# Patient Record
Sex: FEMALE | Race: WHITE | NOT HISPANIC OR LATINO | Employment: UNEMPLOYED | URBAN - METROPOLITAN AREA
[De-identification: names, ages, dates, MRNs, and addresses within clinical notes are randomized per-mention and may not be internally consistent; named-entity substitution may affect disease eponyms.]

---

## 2018-10-26 ENCOUNTER — TELEPHONE (OUTPATIENT)
Dept: SURGERY | Facility: CLINIC | Age: 53
End: 2018-10-26

## 2018-10-26 NOTE — TELEPHONE ENCOUNTER
----- Message from Elke Watts sent at 10/26/2018  2:35 PM CDT -----  Contact: Self [phone]  Patient is requesting a call back from the nurse to schedule as a new patient for a possible multiple cysts in the left breast.  Patient has had a mammogram, spot compression and ultrasound and her referral and records were to have been sent over from [facility].    Patient may be reached at [phone].    Thank you.  LC

## 2018-10-26 NOTE — TELEPHONE ENCOUNTER
Left VM with my direct contact information, patient advised to give a return call with any questions or concerns regarding request for appointment, pt falls into abnormal mammogram protocol, patient will need to have all breast images on a disc with reports sent to [facility] prior to appointment

## 2018-10-29 ENCOUNTER — TELEPHONE (OUTPATIENT)
Dept: SURGERY | Facility: CLINIC | Age: 53
End: 2018-10-29

## 2018-10-29 NOTE — TELEPHONE ENCOUNTER
----- Message from Linda Cruz MA sent at 10/29/2018  9:23 AM CDT -----  Contact: Self [phone]  [facility]ay[facility],     This is the abnormal mammogram patient.    Linda  ----- Message -----  From: Juanita Estrada RN  Sent: 10/26/2018   2:54 PM  To: Linda Cruz MA    Hey there,    This is an abnormal mammogram patient.  Please call on Monday.  Thank you.    Juanita    ----- Message -----  From: Elke Watts  Sent: 10/26/2018   2:35 PM  To: Kartik WICK Staff    Patient is requesting a call back from the nurse to schedule as a new patient for a possible multiple cysts in the left breast.  Patient has had a mammogram, spot compression and ultrasound and her referral and records were to have been sent over from [facility].    Patient may be reached at [phone].    Thank you.  MARIA ELENA

## 2018-10-29 NOTE — TELEPHONE ENCOUNTER
Returned call to patient, we discussed our abnormal mammogram protocol and the need for her images on disc. Patient would like me to email her a release, and she will sign it and get it back to me. Emailed release, told patient I would call her when I had her images in my possession. Provided my direct number before hanging up in case patient needs anything. verbalized understanding to all information

## 2018-10-29 NOTE — TELEPHONE ENCOUNTER
Patient called back after reading the release of information. She states that her imaging was not done at [facility], but [facility]. Told patient this is much easier, we work very closely with them. Called Barbara at [facility] and she will overnight patient's records to us. Patient is aware, she also spoke to her MD's office and they will fax reports. Told her I'd call when I have her images.

## 2018-10-30 ENCOUNTER — TELEPHONE (OUTPATIENT)
Dept: SURGERY | Facility: CLINIC | Age: 53
End: 2018-10-30

## 2018-10-30 NOTE — TELEPHONE ENCOUNTER
Called patient and let her know that her images arrived from [facility], explained I will walk them to radiology for upload and we will call her with the interpretation. Verbalized understanding

## 2018-10-31 ENCOUNTER — HOSPITAL ENCOUNTER (OUTPATIENT)
Dept: RADIOLOGY | Facility: HOSPITAL | Age: 53
Discharge: HOME | End: 2018-10-31
Attending: SURGERY
Payer: MEDICAID

## 2018-11-01 ENCOUNTER — PATIENT MESSAGE (OUTPATIENT)
Dept: RADIOLOGY | Facility: HOSPITAL | Age: 53
End: 2018-11-01

## 2018-11-12 ENCOUNTER — OFFICE VISIT (OUTPATIENT)
Dept: SURGERY | Facility: CLINIC | Age: 53
End: 2018-11-12
Payer: MEDICAID

## 2018-11-12 ENCOUNTER — HOSPITAL ENCOUNTER (OUTPATIENT)
Dept: RADIOLOGY | Facility: HOSPITAL | Age: 53
Discharge: HOME | End: 2018-11-12
Attending: SURGERY
Payer: MEDICAID

## 2018-11-12 VITALS
BODY MASS INDEX: 30.49 KG/M2 | SYSTOLIC BLOOD PRESSURE: 121 MMHG | TEMPERATURE: 98 F | HEIGHT: 65 IN | DIASTOLIC BLOOD PRESSURE: 59 MMHG | WEIGHT: 183 LBS | HEART RATE: 70 BPM

## 2018-11-12 DIAGNOSIS — R92.8 ABNORMAL MAMMOGRAM: ICD-10-CM

## 2018-11-12 DIAGNOSIS — Z80.3 FAMILY HISTORY OF BREAST CANCER: ICD-10-CM

## 2018-11-12 DIAGNOSIS — R92.8 ABNORMAL MAMMOGRAM: Primary | ICD-10-CM

## 2018-11-12 PROCEDURE — 77065 DX MAMMO INCL CAD UNI: CPT | Mod: 26,LT,, | Performed by: RADIOLOGY

## 2018-11-12 PROCEDURE — 99204 OFFICE O/P NEW MOD 45 MIN: CPT | Mod: S$PBB,,, | Performed by: SURGERY

## 2018-11-12 PROCEDURE — 76642 ULTRASOUND BREAST LIMITED: CPT | Mod: 26,LT,, | Performed by: RADIOLOGY

## 2018-11-12 PROCEDURE — 76642 ULTRASOUND BREAST LIMITED: CPT | Mod: TC,PO,LT

## 2018-11-12 PROCEDURE — 77061 BREAST TOMOSYNTHESIS UNI: CPT | Mod: TC,PO,LT

## 2018-11-12 PROCEDURE — 99999 PR PBB SHADOW E&M-EST. PATIENT-LVL III: CPT | Mod: PBBFAC,,, | Performed by: SURGERY

## 2018-11-12 PROCEDURE — 77061 BREAST TOMOSYNTHESIS UNI: CPT | Mod: 26,LT,, | Performed by: RADIOLOGY

## 2018-11-12 PROCEDURE — 77065 DX MAMMO INCL CAD UNI: CPT | Mod: TC,PO,LT

## 2018-11-12 PROCEDURE — 99213 OFFICE O/P EST LOW 20 MIN: CPT | Mod: PBBFAC,PO,25 | Performed by: SURGERY

## 2018-11-12 NOTE — LETTER
November 12, 2018      Janette Carroll MD  [address] [address]  [place] [address]           [facility]  [address]  [facility] [address]  Phone: [phone]  Fax: [phone]          Patient: Neris Saldana   MR Number: [id number]   YOB: 1965   Date of Visit: 11/12/2018       Dear Dr. Janette Carroll:    Thank you for referring Neris Saldana to me for evaluation. Attached you will find relevant portions of my assessment and plan of care.    If you have questions, please do not hesitate to call me. I look forward to following Neris Saldana along with you.    Sincerely,    Meme Verdugo MD    Enclosure  CC:  No Recipients    If you would like to receive this communication electronically, please contact [email or web address] or [phone] to request more information on [employer] access.    For providers and/or their staff who would like to refer a patient to [facility], please contact us through our one-stop-shop provider referral line, [facility], at [phone].    If you feel you have received this communication in error or would no longer like to receive these types of communications, please e-mail [email or web address]

## 2018-11-23 ENCOUNTER — PATIENT MESSAGE (OUTPATIENT)
Dept: SURGERY | Facility: CLINIC | Age: 53
End: 2018-11-23

## 2020-04-08 PROBLEM — S82.142A CLOSED FRACTURE OF LEFT TIBIAL PLATEAU: Status: ACTIVE | Noted: 2020-04-08

## 2020-08-31 PROBLEM — Z74.09 IMPAIRED FUNCTIONAL MOBILITY, BALANCE, GAIT, AND ENDURANCE: Status: ACTIVE | Noted: 2020-08-31

## 2020-08-31 PROBLEM — M25.662 DECREASED ROM OF LEFT KNEE: Status: ACTIVE | Noted: 2020-08-31

## 2022-11-10 ENCOUNTER — HOSPITAL ENCOUNTER (OUTPATIENT)
Dept: RADIOLOGY | Facility: HOSPITAL | Age: 57
Discharge: HOME | End: 2022-11-10
Attending: GENERAL PRACTICE
Payer: MEDICAID

## 2022-11-10 DIAGNOSIS — B18.2 HEP C W/O COMA, CHRONIC: ICD-10-CM

## 2022-11-10 PROCEDURE — 76705 ECHO EXAM OF ABDOMEN: CPT | Mod: TC

## 2022-11-10 PROCEDURE — 76705 US ABDOMEN LIMITED_LIVER: ICD-10-PCS | Mod: 26,,, | Performed by: RADIOLOGY

## 2022-11-10 PROCEDURE — 76705 ECHO EXAM OF ABDOMEN: CPT | Mod: 26,,, | Performed by: RADIOLOGY

## 2024-02-26 ENCOUNTER — HOSPITAL ENCOUNTER (EMERGENCY)
Facility: HOSPITAL | Age: 59
Discharge: HOME | End: 2024-02-26
Attending: STUDENT IN AN ORGANIZED HEALTH CARE EDUCATION/TRAINING PROGRAM
Payer: COMMERCIAL

## 2024-02-26 VITALS
RESPIRATION RATE: 18 BRPM | DIASTOLIC BLOOD PRESSURE: 58 MMHG | HEIGHT: 66 IN | OXYGEN SATURATION: 98 % | HEART RATE: 94 BPM | WEIGHT: 150 LBS | TEMPERATURE: 98 F | BODY MASS INDEX: 24.11 KG/M2 | SYSTOLIC BLOOD PRESSURE: 118 MMHG
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
## 2024-02-26 PROCEDURE — 99284 EMERGENCY DEPT VISIT MOD MDM: CPT | Mod: 25

## 2024-02-26 RX ORDER — KETOROLAC TROMETHAMINE 10 MG/1
10 TABLET, FILM COATED ORAL EVERY 6 HOURS
Qty: 20 TABLET | Refills: 0 | Status: SHIPPED | OUTPATIENT
Start: 2024-02-26 | End: 2024-02-26

## 2024-02-26 RX ORDER — ONDANSETRON 4 MG/1
4 TABLET, FILM COATED ORAL EVERY 6 HOURS
Qty: 12 TABLET | Refills: 0 | Status: SHIPPED | OUTPATIENT
Start: 2024-02-26 | End: 2024-02-26

## 2024-02-26 RX ORDER — ONDANSETRON 4 MG/1
4 TABLET, FILM COATED ORAL EVERY 6 HOURS
Qty: 12 TABLET | Refills: 0 | Status: SHIPPED | OUTPATIENT
Start: 2024-02-26

## 2024-02-26 RX ORDER — KETOROLAC TROMETHAMINE 10 MG/1
10 TABLET, FILM COATED ORAL EVERY 6 HOURS
Qty: 20 TABLET | Refills: 0 | Status: SHIPPED | OUTPATIENT
Start: 2024-02-26 | End: 2024-03-02

## 2024-02-26 NOTE — ED PROVIDER NOTES
Encounter Date: 2/26/2024       History     Chief Complaint   Patient presents with    Fall     Fell yesterday right leg gave out and patient landed on left side. Now complaining of left lower leg pain, pt with plates in left leg from previous injury 3 years prior. Denies hitting head but states nose is hurting.      Chief complaint:  Left leg pain  [age] year female with a history of ADHD Graves disease hep C thyroid nodule tibial plateau fracture presents to be evaluated for pain her left lower leg.  Patient states that she has a bad left knee frequently puts all of her urine her right leg.  Reports that her right leg giving out she fell landing on her left leg.  She reports she has 8/10 deep aching pain to left leg.  Reports being present 2 days ago.  She reports that she is awake but does not significant pain.  Denies any numbness or tingling distally.  She has not taking any medications for symptom relief.      Review of patient's allergies indicates:   Allergen Reactions    Morpholine analogues Hallucinations    Tramadol Hallucinations    Oxycodone Nausea And Vomiting     Past Medical History:   Diagnosis Date    ADHD (attention deficit hyperactivity disorder)     Anxiety     Graves disease     Hepatitis C     History of postoperative nausea     Pelvic mass     Thyroid nodule     Tibial plateau fracture     Tobacco abuse      Past Surgical History:   Procedure Laterality Date    BLADDER SUSPENSION      CHOLECYSTECTOMY      HYSTERECTOMY      OOPHORECTOMY      OPEN REDUCTION AND INTERNAL FIXATION (ORIF) OF FRACTURE OF TIBIAL PLATEAU Left 4/21/2020    Procedure: ORIF, FRACTURE, TIBIA, PLATEAU;  Surgeon: David Torres MD;  Location: [facility];  Service: Orthopedics;  Laterality: Left;    PARTIAL HYSTERECTOMY      THYROIDECTOMY      TONSILLECTOMY       Family History   Problem Relation Age of Onset    Stomach cancer Mother     Ovarian cancer Sister      Social History     Tobacco Use    Smoking status: Every Day      Current packs/day: 1.00     Types: Cigarettes    Tobacco comments:     chantix   Substance Use Topics    Alcohol use: No     Comment: rarely    Drug use: No     Review of Systems   Constitutional:  Negative for fatigue and fever.   Musculoskeletal:  Positive for arthralgias and myalgias. Negative for joint swelling.   Neurological:  Negative for weakness and numbness.       Physical Exam     Initial Vitals [02/26/24 1238]   BP Pulse Resp Temp SpO2   (!) 112/57 92 18 98 °F (36.7 °C) 99 %      MAP       --         Physical Exam    Nursing note and vitals reviewed.  Constitutional: She appears well-developed and well-nourished.   HENT:   Head: Normocephalic and atraumatic.   Cardiovascular:  Normal rate and regular rhythm.           Pulmonary/Chest: Breath sounds normal.   Musculoskeletal:         General: Normal range of motion.      Comments: Pain with flexion and extension of the left lower leg.  No visible deformity neurovascularly intact     Neurological: She is alert and oriented to person, place, and time. She has normal strength.   Skin: Skin is warm and dry.   Psychiatric: She has a normal mood and affect. Thought content normal.         ED Course   Procedures  Labs Reviewed - No data to display       Imaging Results              X-Ray Tibia Fibula 2 View Left (Final result)  Result time 02/26/24 13:35:57      Final result by Emmy Mcgarry MD (02/26/24 13:35:57)                   Impression:      As above.      Electronically signed by: Emmy Mcgarry MD  Date:    02/26/2024  Time:    13:35               Narrative:    EXAMINATION:  XR TIBIA FIBULA 2 VIEW LEFT    CLINICAL HISTORY:  Unspecified fall, initial encounter    TECHNIQUE:  Two views of the left tibia and fibula    COMPARISON:  None.    FINDINGS:  There is a lateral plate and screws stabilizing a healed fracture of the left tibial plateau.  There osteoarthritic changes of the knee joint.  No hardware complication is seen.  No fracture or  dislocation.                                       Medications - No data to display  Medical Decision Making  5-year-old female presents with left leg pain after she had a mechanical fall 2 days ago  Differential diagnosis includes sprain, strain, fracture, ligamentous injury, soft tissue bruising    Amount and/or Complexity of Data Reviewed  Labs: ordered.  Radiology: ordered.     Details: Left tib-fib x-ray    Risk  Prescription drug management.  Risk Details: Patient with tenderness left lower extremity to palpation with flexion and extension left lower leg.  Neurovascular intact distally   Imaging showed stable hardware   Stable for DC with follow with primary care given return precautions patient given crutches in place an Ace wrap any stay she was instructed to follow-up as directed                                      Clinical Impression:  Final diagnoses:  [W19.XXXA] Fall          ED Disposition Condition    Discharge Stable          ED Prescriptions       Medication Sig Dispense Start Date End Date Auth. Provider    ketorolac (TORADOL) 10 mg tablet  (Status: Discontinued) Take 1 tablet (10 mg total) by mouth every 6 (six) hours. for 5 days 20 tablet 2/26/2024 2/26/2024 Ela Randolph NP    ondansetron (ZOFRAN) 4 MG tablet  (Status: Discontinued) Take 1 tablet (4 mg total) by mouth every 6 (six) hours. 12 tablet 2/26/2024 2/26/2024 Ela Randolph NP    ketorolac (TORADOL) 10 mg tablet Take 1 tablet (10 mg total) by mouth every 6 (six) hours. for 5 days 20 tablet 2/26/2024 3/2/2024 Ela Randolph NP    ondansetron (ZOFRAN) 4 MG tablet Take 1 tablet (4 mg total) by mouth every 6 (six) hours. 12 tablet 2/26/2024 -- Ela Randolph NP          Follow-up Information    None          Ela Randolph NP  02/26/24 2703